# Patient Record
Sex: MALE | Race: WHITE | NOT HISPANIC OR LATINO | Employment: OTHER | URBAN - METROPOLITAN AREA
[De-identification: names, ages, dates, MRNs, and addresses within clinical notes are randomized per-mention and may not be internally consistent; named-entity substitution may affect disease eponyms.]

---

## 2017-01-12 ENCOUNTER — TELEPHONE (OUTPATIENT)
Dept: PULMONOLOGY | Facility: HOSPICE | Age: 52
End: 2017-01-12

## 2017-01-12 ENCOUNTER — HOSPITAL ENCOUNTER (OUTPATIENT)
Dept: OTHER | Facility: MEDICAL CENTER | Age: 52
End: 2017-01-12
Attending: FAMILY MEDICINE
Payer: COMMERCIAL

## 2017-01-12 PROCEDURE — G0424 PULMONARY REHAB W EXER: HCPCS

## 2017-01-12 NOTE — TELEPHONE ENCOUNTER
1. Caller Name: Tasneem (Spouse)                      Call Back Number: [phone] or [phone]     2. Message: Patient would like some samples of Symbicort 160/4.5 or a Rx Refill for Symbicort. He is currently out of this inhaler at the moment.  Please advise  Last office visit 10/26/16 with ISSA Echols    3. Patient approves office to leave a detailed voicemail/MyChart message: N\A

## 2017-01-13 ENCOUNTER — HOSPITAL ENCOUNTER (OUTPATIENT)
Dept: OTHER | Facility: MEDICAL CENTER | Age: 52
End: 2017-01-13
Attending: FAMILY MEDICINE
Payer: COMMERCIAL

## 2017-01-13 PROCEDURE — G0424 PULMONARY REHAB W EXER: HCPCS

## 2017-01-13 NOTE — TELEPHONE ENCOUNTER
Spoke with Tasneem (Spouse) to let her know I would leave some samples of Symbicort 160/4.5 at the  for .

## 2017-01-17 ENCOUNTER — APPOINTMENT (OUTPATIENT)
Dept: OTHER | Facility: MEDICAL CENTER | Age: 52
End: 2017-01-17
Attending: FAMILY MEDICINE
Payer: COMMERCIAL

## 2017-01-17 PROCEDURE — G0424 PULMONARY REHAB W EXER: HCPCS

## 2017-01-18 ENCOUNTER — OFFICE VISIT (OUTPATIENT)
Dept: PULMONOLOGY | Facility: HOSPICE | Age: 52
End: 2017-01-18
Payer: COMMERCIAL

## 2017-01-18 VITALS
TEMPERATURE: 97.5 F | SYSTOLIC BLOOD PRESSURE: 126 MMHG | DIASTOLIC BLOOD PRESSURE: 80 MMHG | BODY MASS INDEX: 44.95 KG/M2 | HEART RATE: 101 BPM | RESPIRATION RATE: 20 BRPM | OXYGEN SATURATION: 95 % | HEIGHT: 70 IN | WEIGHT: 314 LBS

## 2017-01-18 DIAGNOSIS — Z85.118 HISTORY OF LUNG CANCER: ICD-10-CM

## 2017-01-18 DIAGNOSIS — J44.9 CHRONIC OBSTRUCTIVE PULMONARY DISEASE, UNSPECIFIED COPD TYPE (HCC): ICD-10-CM

## 2017-01-18 DIAGNOSIS — G47.33 OSA (OBSTRUCTIVE SLEEP APNEA): ICD-10-CM

## 2017-01-18 PROCEDURE — 99214 OFFICE O/P EST MOD 30 MIN: CPT | Performed by: INTERNAL MEDICINE

## 2017-01-18 RX ORDER — BUDESONIDE AND FORMOTEROL FUMARATE DIHYDRATE 160; 4.5 UG/1; UG/1
2 AEROSOL RESPIRATORY (INHALATION) 2 TIMES DAILY
Qty: 1 INHALER | Refills: 6 | Status: SHIPPED | OUTPATIENT
Start: 2017-01-18

## 2017-01-18 RX ORDER — VERAPAMIL HYDROCHLORIDE 240 MG/1
1 CAPSULE, EXTENDED RELEASE ORAL DAILY
COMMUNITY
Start: 2016-11-27

## 2017-01-18 NOTE — PROGRESS NOTES
Chief Complaint   Patient presents with   • Follow-Up     3 month follow up       HPI: This patient is a 52 y.o. Male returns for follow-up of COPD, NAVA and lung cancer. He was diagnosed with squamous cell lung cancer in January 2016 status post chemoradiation and surgical resection. He follows with  and has a surveillance chest CAT scan pending in March 2017. Postoperative PFTs showed stage II COPD with FEV1 2.91 L or 75%, DLCO 78%. He was using Symbicort 160 µg inhaler however had run out. He is now enrolled in pulmonary rehabilitation. Overall he has good and bad days, and admits he gets frustrated with the bad days. He denies sputum purulence, chest tightness or edema. He has a mild cough and wheezing which has been stable.  He uses AutoPap 12-18 cm of water with compliance card confirming 87% use for 7.5 hours nightly and normal AHI of 0.2. Denies any difficulty with mask fit.    Past Medical History   Diagnosis Date   • Hypertension    • Heart burn    • Indigestion    • Carcinoma in situ of respiratory system    • Breath shortness    • Snoring    • Cancer (CMS-HCC) 1/11/2016     lung   • Sleep apnea      Uses CPAP   • Oxygen decrease      occasionally uses oxygen at 2liters as needed   • Cold 9/22/2016     URI       Social History     Social History   • Marital Status:      Spouse Name: N/A   • Number of Children: N/A   • Years of Education: N/A     Occupational History   • Not on file.     Social History Main Topics   • Smoking status: Former Smoker -- 3.00 packs/day for 12 years     Types: Cigarettes, Cigars     Start date: 01/01/1975     Quit date: 01/01/1987   • Smokeless tobacco: Never Used      Comment: 3 cigars a day   • Alcohol Use: 0.0 - 5.4 oz/week     0-9 Standard drinks or equivalent per week      Comment: 2-3 per month   • Drug Use: No   • Sexual Activity: Not on file     Other Topics Concern   • Not on file     Social History Narrative       Family History   Problem Relation Age  "of Onset   • Cancer Mother      breast   • Diabetes Mother    • Hypertension Mother        Current Outpatient Prescriptions on File Prior to Visit   Medication Sig Dispense Refill   • budesonide-formoterol (SYMBICORT) 160-4.5 MCG/ACT Aerosol Inhale 2 Puffs by mouth 2 Times a Day.     • Multiple Vitamins-Minerals (MULTIVITAMIN ADULT PO) Take 1 tablet by mouth every day.     • lisinopril-hydrochlorothiazide (PRINZIDE, ZESTORETIC) 20-25 MG per tablet Take 1 Tab by mouth every day.     • albuterol (VENTOLIN HFA) 108 (90 BASE) MCG/ACT Aero Soln inhalation aerosol Inhale 2 Puffs by mouth every 6 hours as needed for Shortness of Breath. 1 Inhaler 3   • vitamin D (CHOLECALCIFEROL) 1000 UNIT Tab Take 1,000 Units by mouth every day.     • allopurinol (ZYLOPRIM) 100 MG Tab Take 100 mg by mouth every day.     • Azelastine-Fluticasone (DYMISTA) 137-50 MCG/ACT Suspension Spray 1 Spray in nose 2 Times a Day.     • azithromycin (ZITHROMAX) 250 MG Tab Take 2 tablets on day 1, then take 1 tablet a day for 4 days. (Patient not taking: Reported on 1/18/2017) 6 Tab 0   • verapamil ER (CALAN-SR) 240 MG Tab CR Take 240 mg by mouth every day.       No current facility-administered medications on file prior to visit.       Allergies: Polysporin; Other environmental; Pollen extract; Tape; and Tegaderm ag mesh 2\"x2\"    ROS:   Constitutional: Denies fevers, chills, night sweats, fatigue or weight loss  Eyes: Denies vision loss, pain, drainage, double vision  Ears, Nose, Throat: Denies earache, difficulty hearing, tinnitus, nasal congestion, hoarseness  Cardiovascular: Denies chest pain, tightness, palpitations, orthopnea or edema  Respiratory: As in history of present illness  Sleep: Denies daytime sleepiness, snoring, apneas, insomnia, morning headaches  GI: Denies heartburn, dysphagia, nausea, abdominal pain, diarrhea or constipation  : Denies frequent urination, hematuria, discharge or painful urination  Musculoskeletal: Denies back pain, " "painful joints, sore muscles  Neurological: Denies weakness or headaches  Skin: No rashes    Blood pressure 126/80, pulse 101, temperature 36.4 °C (97.5 °F), resp. rate 20, height 1.778 m (5' 10\"), weight 142.429 kg (314 lb), SpO2 95 %.  Multi-Ox Readings  Multi Ox #1     O2 sat % at rest     O2 sat % on exertion     O2 sat average on exertion     Multi Ox #2     O2 sat % at rest     O2 sat % on exertion     O2 sat average on exertion       Oxygen Use     Oxygen Frequency     Duration of need     Is the patient mobile within the home?     CPAP Use?     BIPAP Use?     Servo Titration         Physical Exam:  Appearance: Well-nourished, well-developed, in no acute distress  HEENT: Normocephalic, atraumatic, white sclera, PERRLA, oropharynx clear, Mallampati 3  Neck: No adenopathy or masses  Respiratory: no intercostal retractions or accessory muscle use  Lungs auscultation: Clear to auscultation bilaterally, diminished breath sounds  Cardiovascular: Regular rate rhythm. No murmurs, rubs or gallops.  No LE edema  Abdomen: soft, nondistended  Gait: Normal  Digits: No clubbing, cyanosis  Motor: No focal deficits  Orientation: Oriented to time, person and place    Diagnosis:  1. Chronic obstructive pulmonary disease, unspecified COPD type (CMS-HCC)     2. NAVA (obstructive sleep apnea)     3. History of lung cancer         Plan:  Resume Symbicort 160/4.52 puffs twice a day. Continue albuterol HFA 1-2 puffs every 4 hours when necessary.  Encouraged pulmonary rehabilitation. Further titration of oxygen will be performed through pulmonary rehabilitation.  Continue AutoPap 12-18 cm of water.  [employer] handicap parking form provided.  Surveillance chest CAT scan March 2017 for follow-up of lung cancer.  Return in about 3 months (around 4/18/2017).        "

## 2017-01-19 ENCOUNTER — HOSPITAL ENCOUNTER (OUTPATIENT)
Dept: OTHER | Facility: MEDICAL CENTER | Age: 52
End: 2017-01-19
Attending: FAMILY MEDICINE
Payer: COMMERCIAL

## 2017-01-19 PROCEDURE — G0424 PULMONARY REHAB W EXER: HCPCS

## 2017-01-24 ENCOUNTER — HOSPITAL ENCOUNTER (OUTPATIENT)
Dept: OTHER | Facility: MEDICAL CENTER | Age: 52
End: 2017-01-24
Attending: FAMILY MEDICINE
Payer: COMMERCIAL

## 2017-01-24 PROCEDURE — G0424 PULMONARY REHAB W EXER: HCPCS

## 2017-01-26 ENCOUNTER — HOSPITAL ENCOUNTER (OUTPATIENT)
Dept: OTHER | Facility: MEDICAL CENTER | Age: 52
End: 2017-01-26
Attending: FAMILY MEDICINE
Payer: COMMERCIAL

## 2017-01-26 PROCEDURE — G0424 PULMONARY REHAB W EXER: HCPCS

## 2017-01-31 ENCOUNTER — HOSPITAL ENCOUNTER (OUTPATIENT)
Dept: OTHER | Facility: MEDICAL CENTER | Age: 52
End: 2017-01-31
Attending: FAMILY MEDICINE
Payer: COMMERCIAL

## 2017-01-31 PROCEDURE — G0424 PULMONARY REHAB W EXER: HCPCS

## 2017-02-02 ENCOUNTER — HOSPITAL ENCOUNTER (OUTPATIENT)
Dept: OTHER | Facility: MEDICAL CENTER | Age: 52
End: 2017-02-02
Attending: FAMILY MEDICINE
Payer: COMMERCIAL

## 2017-02-02 PROCEDURE — G0424 PULMONARY REHAB W EXER: HCPCS

## 2017-02-07 ENCOUNTER — HOSPITAL ENCOUNTER (OUTPATIENT)
Dept: OTHER | Facility: MEDICAL CENTER | Age: 52
End: 2017-02-07
Attending: FAMILY MEDICINE
Payer: COMMERCIAL

## 2017-02-07 PROCEDURE — G0424 PULMONARY REHAB W EXER: HCPCS

## 2017-02-09 ENCOUNTER — HOSPITAL ENCOUNTER (OUTPATIENT)
Dept: OTHER | Facility: MEDICAL CENTER | Age: 52
End: 2017-02-09
Attending: FAMILY MEDICINE
Payer: COMMERCIAL

## 2017-02-09 PROCEDURE — G0424 PULMONARY REHAB W EXER: HCPCS

## 2017-02-14 ENCOUNTER — HOSPITAL ENCOUNTER (OUTPATIENT)
Dept: OTHER | Facility: MEDICAL CENTER | Age: 52
End: 2017-02-14
Attending: FAMILY MEDICINE
Payer: COMMERCIAL

## 2017-02-14 PROCEDURE — G0424 PULMONARY REHAB W EXER: HCPCS

## 2017-02-16 ENCOUNTER — HOSPITAL ENCOUNTER (OUTPATIENT)
Dept: OTHER | Facility: MEDICAL CENTER | Age: 52
End: 2017-02-16
Attending: FAMILY MEDICINE
Payer: COMMERCIAL

## 2017-02-16 PROCEDURE — G0424 PULMONARY REHAB W EXER: HCPCS

## 2017-02-21 ENCOUNTER — HOSPITAL ENCOUNTER (OUTPATIENT)
Dept: OTHER | Facility: MEDICAL CENTER | Age: 52
End: 2017-02-21
Attending: FAMILY MEDICINE
Payer: COMMERCIAL

## 2017-02-21 PROCEDURE — G0424 PULMONARY REHAB W EXER: HCPCS

## 2017-02-23 ENCOUNTER — HOSPITAL ENCOUNTER (OUTPATIENT)
Dept: OTHER | Facility: MEDICAL CENTER | Age: 52
End: 2017-02-23
Attending: FAMILY MEDICINE
Payer: COMMERCIAL

## 2017-02-23 PROCEDURE — G0424 PULMONARY REHAB W EXER: HCPCS

## 2017-02-27 ENCOUNTER — TELEPHONE (OUTPATIENT)
Dept: PULMONOLOGY | Facility: HOSPICE | Age: 52
End: 2017-02-27

## 2017-02-27 DIAGNOSIS — J44.9 CHRONIC OBSTRUCTIVE PULMONARY DISEASE, UNSPECIFIED COPD TYPE (HCC): ICD-10-CM

## 2017-02-27 RX ORDER — METHYLPREDNISOLONE 4 MG/1
TABLET ORAL
Qty: 21 TAB | Refills: 0 | Status: SHIPPED | OUTPATIENT
Start: 2017-02-27

## 2017-02-27 RX ORDER — AZITHROMYCIN 250 MG/1
TABLET, FILM COATED ORAL
Qty: 6 TAB | Refills: 0 | Status: SHIPPED | OUTPATIENT
Start: 2017-02-27

## 2017-02-27 NOTE — TELEPHONE ENCOUNTER
RX for Zpak and Medrol dosepack sent to local pharmacy to have on hand for treatment of acute infectious symptoms. Encourage he call to schedule and OV if he requires use of these medications.

## 2017-02-27 NOTE — TELEPHONE ENCOUNTER
"Bill is calling today, he was at [facility] and they suggest he call and we give him a \"Sick Day Plan:\". I asked him what that was and he states its where he would have antibiotics and steroid on hand in case he felt sick. He does not have any symptoms at this time.  Last OV: 01/18/2017 - Dr. Greene  Next OV: 04/20/2017 - Dr. Greene    "

## 2017-02-28 ENCOUNTER — HOSPITAL ENCOUNTER (OUTPATIENT)
Dept: OTHER | Facility: MEDICAL CENTER | Age: 52
End: 2017-02-28
Attending: FAMILY MEDICINE
Payer: COMMERCIAL

## 2017-02-28 PROCEDURE — G0424 PULMONARY REHAB W EXER: HCPCS

## 2017-03-02 ENCOUNTER — HOSPITAL ENCOUNTER (OUTPATIENT)
Dept: OTHER | Facility: MEDICAL CENTER | Age: 52
End: 2017-03-02
Attending: FAMILY MEDICINE
Payer: COMMERCIAL

## 2017-03-02 PROCEDURE — G0424 PULMONARY REHAB W EXER: HCPCS

## 2017-03-07 ENCOUNTER — APPOINTMENT (OUTPATIENT)
Dept: OTHER | Facility: MEDICAL CENTER | Age: 52
End: 2017-03-07
Attending: FAMILY MEDICINE
Payer: COMMERCIAL

## 2017-03-09 ENCOUNTER — APPOINTMENT (OUTPATIENT)
Dept: OTHER | Facility: MEDICAL CENTER | Age: 52
End: 2017-03-09
Attending: FAMILY MEDICINE
Payer: COMMERCIAL

## 2017-03-14 ENCOUNTER — HOSPITAL ENCOUNTER (OUTPATIENT)
Dept: OTHER | Facility: MEDICAL CENTER | Age: 52
End: 2017-03-14
Attending: FAMILY MEDICINE
Payer: COMMERCIAL

## 2017-03-14 PROCEDURE — G0424 PULMONARY REHAB W EXER: HCPCS

## 2017-03-16 ENCOUNTER — HOSPITAL ENCOUNTER (OUTPATIENT)
Dept: OTHER | Facility: MEDICAL CENTER | Age: 52
End: 2017-03-16
Attending: FAMILY MEDICINE
Payer: COMMERCIAL

## 2017-03-16 PROCEDURE — G0424 PULMONARY REHAB W EXER: HCPCS

## 2017-03-21 ENCOUNTER — HOSPITAL ENCOUNTER (OUTPATIENT)
Dept: OTHER | Facility: MEDICAL CENTER | Age: 52
End: 2017-03-21
Attending: FAMILY MEDICINE
Payer: COMMERCIAL

## 2017-03-21 PROCEDURE — G0424 PULMONARY REHAB W EXER: HCPCS

## 2017-03-23 ENCOUNTER — APPOINTMENT (OUTPATIENT)
Dept: OTHER | Facility: MEDICAL CENTER | Age: 52
End: 2017-03-23
Attending: FAMILY MEDICINE
Payer: COMMERCIAL

## 2017-03-28 ENCOUNTER — APPOINTMENT (OUTPATIENT)
Dept: OTHER | Facility: MEDICAL CENTER | Age: 52
End: 2017-03-28
Attending: FAMILY MEDICINE
Payer: COMMERCIAL

## 2017-03-30 ENCOUNTER — APPOINTMENT (OUTPATIENT)
Dept: OTHER | Facility: MEDICAL CENTER | Age: 52
End: 2017-03-30
Attending: FAMILY MEDICINE
Payer: COMMERCIAL

## 2017-04-12 DIAGNOSIS — R06.02 SOB (SHORTNESS OF BREATH): ICD-10-CM

## 2017-04-13 RX ORDER — ALBUTEROL SULFATE 90 UG/1
AEROSOL, METERED RESPIRATORY (INHALATION)
Qty: 1 INHALER | Refills: 5 | Status: SHIPPED | OUTPATIENT
Start: 2017-04-13

## 2017-04-13 NOTE — TELEPHONE ENCOUNTER
Have we ever prescribed this med? Yes.  If yes, what date? 08/04/2016    Last OV: 01/18/2017 - Dr. Greene    Next OV: 04/20/2017 - Dr. Greene    DX: SOB    Medications: VENTOLIN

## 2017-04-19 ENCOUNTER — HOSPITAL ENCOUNTER (OUTPATIENT)
Dept: RADIOLOGY | Facility: MEDICAL CENTER | Age: 52
End: 2017-04-19

## 2017-04-20 ENCOUNTER — OFFICE VISIT (OUTPATIENT)
Dept: PULMONOLOGY | Facility: HOSPICE | Age: 52
End: 2017-04-20
Payer: COMMERCIAL

## 2017-04-20 VITALS
DIASTOLIC BLOOD PRESSURE: 82 MMHG | TEMPERATURE: 97 F | RESPIRATION RATE: 17 BRPM | BODY MASS INDEX: 44.95 KG/M2 | OXYGEN SATURATION: 93 % | SYSTOLIC BLOOD PRESSURE: 130 MMHG | HEART RATE: 91 BPM | WEIGHT: 314 LBS | HEIGHT: 70 IN

## 2017-04-20 DIAGNOSIS — J44.9 CHRONIC OBSTRUCTIVE PULMONARY DISEASE, UNSPECIFIED COPD TYPE (HCC): ICD-10-CM

## 2017-04-20 PROCEDURE — 99214 OFFICE O/P EST MOD 30 MIN: CPT | Performed by: INTERNAL MEDICINE

## 2017-04-20 RX ORDER — VERAPAMIL HYDROCHLORIDE 120 MG/1
CAPSULE, EXTENDED RELEASE ORAL
COMMUNITY
Start: 2017-03-07

## 2017-04-20 NOTE — PROGRESS NOTES
Chief Complaint   Patient presents with   • Follow-Up     3 months     HPI: This patient is a 52 y.o. Male returns for follow-up of COPD, NAVA and lung cancer. He was diagnosed with squamous cell lung cancer in January 2016, status post chemoradiation and surgical resection. He follows with  and had a surveillance chest CAT in March 2017 showing no evidence of cancer recurrence. He has occasional chest wall discomfort at the incision site. Postoperative PFTs showed stage II COPD, with FEV1 2.91 L or 75%, DLCO 78%. He is using Symbicort 160 µg BID, and successfully completed pulmonary rehabilitation which he found of benefit. Uses oxygen at 2 L/m. His exertional dyspnea has been stable. He denies sputum purulence, chest tightness or edema. He has a mild cough which has been stable. Denies AECOPD over the past 6 months. He is under more stress status he is being evicted and is looking for a new household.  He uses AutoPap 12-18 cm of water, forgot his CPAP compliance chip. Prior download showed excellent compliance and response to treatment with normal AHI. Denies any difficulty with mask fit. Weight is been stable.      Past Medical History   Diagnosis Date   • Hypertension    • Heart burn    • Indigestion    • Carcinoma in situ of respiratory system    • Breath shortness    • Snoring    • Cancer (CMS-[facility]) 1/11/2016     lung   • Sleep apnea      Uses CPAP   • Oxygen decrease      occasionally uses oxygen at 2liters as needed   • Cold 9/22/2016     URI       Social History     Social History   • Marital Status:      Spouse Name: N/A   • Number of Children: N/A   • Years of Education: N/A     Occupational History   • Not on file.     Social History Main Topics   • Smoking status: Former Smoker -- 3.00 packs/day for 12 years     Types: Cigarettes, Cigars     Start date: 01/01/1975     Quit date: 01/01/1987   • Smokeless tobacco: Never Used      Comment: 3 cigars a day   • Alcohol Use: 0.0 - 5.4 oz/week  "    0-9 Standard drinks or equivalent per week      Comment: 2-3 per month   • Drug Use: No   • Sexual Activity: Not on file     Other Topics Concern   • Not on file     Social History Narrative       Family History   Problem Relation Age of Onset   • Cancer Mother      breast   • Diabetes Mother    • Hypertension Mother    • Hypertension Father        Current Outpatient Prescriptions on File Prior to Visit   Medication Sig Dispense Refill   • VENTOLIN  (90 BASE) MCG/ACT Aero Soln inhalation aerosol INHALE 2 PUFFS BY MOUTH EVERY 6 HOURS AS NEEDED FOR SHORTNESS OF BREATH. 1 Inhaler 5   • azithromycin (ZITHROMAX) 250 MG Tab Take 2 tablets on day 1, then take 1 tablet a day for 4 days. 6 Tab 0   • MethylPREDNISolone (MEDROL DOSEPAK) 4 MG Tablet Therapy Pack Take as directed. 21 Tab 0   • Verapamil HCl  MG CAPSULE SR 24 HR Take 1 Cap by mouth every day.     • budesonide-formoterol (SYMBICORT) 160-4.5 MCG/ACT Aerosol Inhale 2 Puffs by mouth 2 Times a Day. 1 Inhaler 6   • Azelastine-Fluticasone (DYMISTA) 137-50 MCG/ACT Suspension Spray 1 Spray in nose 2 Times a Day.     • Multiple Vitamins-Minerals (MULTIVITAMIN ADULT PO) Take 1 tablet by mouth every day.     • lisinopril-hydrochlorothiazide (PRINZIDE, ZESTORETIC) 20-25 MG per tablet Take 1 Tab by mouth every day.     • azithromycin (ZITHROMAX) 250 MG Tab Take 2 tablets on day 1, then take 1 tablet a day for 4 days. (Patient not taking: Reported on 1/18/2017) 6 Tab 0   • vitamin D (CHOLECALCIFEROL) 1000 UNIT Tab Take 1,000 Units by mouth every day.     • verapamil ER (CALAN-SR) 240 MG Tab CR Take 240 mg by mouth every day.     • allopurinol (ZYLOPRIM) 100 MG Tab Take 100 mg by mouth every day.       No current facility-administered medications on file prior to visit.       Allergies: Polysporin; Other environmental; Pollen extract; Tape; and Tegaderm ag mesh 2\"x2\"    ROS:   Constitutional: Denies fevers, chills, night sweats, fatigue or weight loss  Eyes: " "Denies vision loss, pain, drainage, double vision  Ears, Nose, Throat: Denies earache, difficulty hearing, tinnitus, nasal congestion, hoarseness  Cardiovascular: Denies chest pain, tightness, palpitations, orthopnea or edema  Respiratory: As in history of present illness  Sleep: Denies daytime sleepiness, snoring, apneas, insomnia, morning headaches  GI: Denies heartburn, dysphagia, nausea, abdominal pain, diarrhea or constipation  : Denies frequent urination, hematuria, discharge or painful urination  Musculoskeletal: Denies back pain, painful joints, sore muscles  Neurological: Denies weakness or headaches  Skin: No rashes    Blood pressure 130/82, pulse 91, temperature 36.1 °C (97 °F), resp. rate 17, height 1.778 m (5' 10\"), weight 142.429 kg (314 lb), SpO2 93 %.  Multi-Ox Readings  Multi Ox #1     O2 sat % at rest     O2 sat % on exertion     O2 sat average on exertion     Multi Ox #2     O2 sat % at rest     O2 sat % on exertion     O2 sat average on exertion       Oxygen Use 2   Oxygen Frequency With exertion   Duration of need Lifetime   Is the patient mobile within the home? Yes   CPAP Use? unsure of pressure   BIPAP Use?     Servo Titration         Physical Exam:  Appearance: Well-nourished, well-developed, in no acute distress  HEENT: Normocephalic, atraumatic, white sclera, PERRLA, oropharynx clear, Mallampati 3  Neck: No adenopathy or masses  Respiratory: no intercostal retractions or accessory muscle use  Lungs auscultation: Clear to auscultation bilaterally, diminished breath sounds  Cardiovascular: Regular rate rhythm. No murmurs, rubs or gallops.  No LE edema  Abdomen: soft, obese  Gait: Normal  Digits: No clubbing, cyanosis  Motor: No focal deficits  Orientation: Oriented to time, person and place    Diagnosis:  No diagnosis found.   1. COPD, stable  2. Chronic hypoxemic respiratory failure  3. Lung cancer status post resection and chemoradiation.     Plan:  The patient's COPD is clinically " stable. Continue Symbicort 160 µg 2 puffs twice a day with albuterol HFA when necessary. Continue oxygen at 2 L/m with exertion.  Continue AutoPap 12-18 cm of water. Compliance card download on follow-up.  Dr. Duong has been scheduling his surveillance chest CAT scans and we will continue to monitor along.  Return in about 6 months (around 10/20/2017).

## 2017-04-20 NOTE — MR AVS SNAPSHOT
"        Adrian Contreras   2017 9:40 AM   Office Visit   MRN: [id number]    Department:  [facility]   Dept Phone:  [phone]    Description:  Male : 1965   Provider:  Kiera Greene M.D.           Reason for Visit     Follow-Up 3 months      Allergies as of 2017     Allergen Noted Reactions    Polysporin [Bacitracin-Polymyxin B] 2016       Other Environmental 2016       Pollen Extract 2016       Tape 2016       blisters    Tegaderm Ag Mesh 2\"X2\" [Wound Dressings] 2016       blisters      Vital Signs     Blood Pressure Pulse Temperature Respirations Height Weight    130/82 mmHg 91 36.1 °C (97 °F) 17 1.778 m (5' 10\") 142.429 kg (314 lb)    Body Mass Index Oxygen Saturation Smoking Status             45.05 kg/m2 93% Former Smoker         Basic Information     Date Of Birth Sex Race Ethnicity Preferred Language    1965 Male White Non- English      Your appointments     Oct 26, 2017  8:00 AM   Established Patient Pul with Kiera Greene M.D.   [facility] (--)    [address] [facility] [address]  [facility] [id number] [phone]              Problem List              ICD-10-CM Priority Class Noted - Resolved    History of lung cancer Z85.118   2016 - Present    NAVA (obstructive sleep apnea) G47.33   2016 - Present    Shortness of breath R06.02   2016 - Present    Bronchial spasms J98.01   2016 - Present    Personal history of malignant neoplasm of lung Z85.118   2016 - Present    Seasonal allergies J30.2   10/12/2016 - Present      Health Maintenance        Date Due Completion Dates    IMM DTaP/Tdap/Td Vaccine (1 - Tdap) 1984 ---    COLONOSCOPY 2015 ---            Current Immunizations     Pneumococcal polysaccharide vaccine (PPSV-23) 2016      Below and/or attached are the medications your provider expects you to take. Review all of your home medications and newly ordered medications with your " "provider and/or pharmacist. Follow medication instructions as directed by your provider and/or pharmacist. Please keep your medication list with you and share with your provider. Update the information when medications are discontinued, doses are changed, or new medications (including over-the-counter products) are added; and carry medication information at all times in the event of emergency situations     Allergies:  POLYSPORIN - (reactions not documented)     OTHER ENVIRONMENTAL - (reactions not documented)     POLLEN EXTRACT - (reactions not documented)     TAPE - (reactions not documented)     TEGADERM AG MESH 2\"X2\" - (reactions not documented)               Medications  Valid as of: April 20, 2017 - 10:14 AM    Generic Name Brand Name Tablet Size Instructions for use    Albuterol Sulfate (Aero Soln) VENTOLIN  (90 BASE) MCG/ACT INHALE 2 PUFFS BY MOUTH EVERY 6 HOURS AS NEEDED FOR SHORTNESS OF BREATH.        Allopurinol (Tab) ZYLOPRIM 100 MG Take 100 mg by mouth every day.        Azelastine-Fluticasone (Suspension) Azelastine-Fluticasone 137-50 MCG/ACT Spray 1 Spray in nose 2 Times a Day.        Azithromycin (Tab) ZITHROMAX 250 MG Take 2 tablets on day 1, then take 1 tablet a day for 4 days.        Azithromycin (Tab) ZITHROMAX 250 MG Take 2 tablets on day 1, then take 1 tablet a day for 4 days.        Budesonide-Formoterol Fumarate (Aerosol) SYMBICORT 160-4.5 MCG/ACT Inhale 2 Puffs by mouth 2 Times a Day.        Cholecalciferol (Tab) cholecalciferol 1000 UNIT Take 1,000 Units by mouth every day.        Lisinopril-Hydrochlorothiazide (Tab) PRINZIDE, ZESTORETIC 20-25 MG Take 1 Tab by mouth every day.        MethylPREDNISolone (Tablet Therapy Pack) MEDROL DOSEPAK 4 MG Take as directed.        Multiple Vitamins-Minerals   Take 1 tablet by mouth every day.        Verapamil HCl (Tab CR) CALAN- MG Take 240 mg by mouth every day.        Verapamil HCl (CAPSULE SR 24 HR) Verapamil HCl  MG Take 1 Cap by " mouth every day.        Verapamil HCl (CAPSULE SR 24 HR) CALAN  MG         .                 Medicines prescribed today were sent to:     [facility] #[id number] - [place] - [address] [place] [address]    Phone: [phone] Fax: [phone]    Open 24 Hours?: No    D[facility] [place]    [address] [place] [address]    Phone: [phone] Fax: [phone]      Medication refill instructions:       If your prescription bottle indicates you have medication refills left, it is not necessary to call your provider’s office. Please contact your pharmacy and they will refill your medication.    If your prescription bottle indicates you do not have any refills left, you may request refills at any time through one of the following ways: The online Dealised system (except Urgent Care), by calling your provider’s office, or by asking your pharmacy to contact your provider’s office with a refill request. Medication refills are processed only during regular business hours and may not be available until the next business day. Your provider may request additional information or to have a follow-up visit with you prior to refilling your medication.   *Please Note: Medication refills are assigned a new Rx number when refilled electronically. Your pharmacy may indicate that no refills were authorized even though a new prescription for the same medication is available at the pharmacy. Please request the medicine by name with the pharmacy before contacting your provider for a refill.           Dealised Access Code: [email or web address]  Expires: 5/20/2017 10:14 AM    Dealised  A secure, online tool to manage your health information     [employer]’s Dealised® is a secure, online tool that connects you to your personalized health information from the privacy of your home -- day or night - making it very easy for you to manage your healthcare. Once the activation process is completed, you can even access your  medical information using the hipix mirna, which is available for free in the Apple Mirna store or Google Play store.     hipix provides the following levels of access (as shown below):   My Chart Features   [facility] Doctor [facility]  Specialists [facility]  Urgent  Care Non-[facility]  Primary Care  Doctor   Email your healthcare team securely and privately 24/7 X X X    Manage appointments: schedule your next appointment; view details of past/upcoming appointments X      Request prescription refills. X      View recent personal medical records, including lab and immunizations X X X X   View health record, including health history, allergies, medications X X X X   Read reports about your outpatient visits, procedures, consult and ER notes X X X X   See your discharge summary, which is a recap of your hospital and/or ER visit that includes your diagnosis, lab results, and care plan. X X       How to register for hipix:  1. Go to  [email or web address].  2. Click on the Sign Up Now box, which takes you to the New Member Sign Up page. You will need to provide the following information:  a. Enter your hipix Access Code exactly as it appears at the top of this page. (You will not need to use this code after you’ve completed the sign-up process. If you do not sign up before the expiration date, you must request a new code.)   b. Enter your date of birth.   c. Enter your home email address.   d. Click Submit, and follow the next screen’s instructions.  3. Create a hipix ID. This will be your hipix login ID and cannot be changed, so think of one that is secure and easy to remember.  4. Create a hipix password. You can change your password at any time.  5. Enter your Password Reset Question and Answer. This can be used at a later time if you forget your password.   6. Enter your e-mail address. This allows you to receive e-mail notifications when new information is available in hipix.  7. Click Sign Up. You  can now view your health information.    For assistance activating your [employer] account, call [phone]

## 2017-05-25 ENCOUNTER — TELEPHONE (OUTPATIENT)
Dept: PULMONOLOGY | Facility: HOSPICE | Age: 52
End: 2017-05-25

## 2017-05-25 NOTE — TELEPHONE ENCOUNTER
Tasneem the pt's wife called requesting samples of Symbicort 160 for the pt, states that they are moving in 2 days and would some samples to hold the pt over until the pt gets established with a new pulmonologist.      Last OV: 4/20/17  Next OV: 10/26/17  COPD  Samples of Symbicort 160

## 2017-07-06 ENCOUNTER — TELEPHONE (OUTPATIENT)
Dept: PULMONOLOGY | Facility: HOSPICE | Age: 52
End: 2017-07-06

## 2017-07-06 DIAGNOSIS — Z85.118 HX OF CANCER OF LUNG: ICD-10-CM

## 2017-07-06 DIAGNOSIS — G47.33 OSA (OBSTRUCTIVE SLEEP APNEA): ICD-10-CM

## 2017-07-06 NOTE — TELEPHONE ENCOUNTER
Pt called requesting a referral to be sent to Silvia Dwyer his new pulmonologist w/ [facility] in [place], states that he has moved out of the area and need the referral sent to them.     Office # [phone]  Fax [phone]    Please Attention referral to: New Pt coordinator     Last OV: 4/20/17  Next OV: 10/26/17  NAVA

## 2017-07-06 NOTE — TELEPHONE ENCOUNTER
Spoke with pt, states that he has only been a pt of 's since 2016 and only need his records from that yr.